# Patient Record
Sex: FEMALE | Race: WHITE
[De-identification: names, ages, dates, MRNs, and addresses within clinical notes are randomized per-mention and may not be internally consistent; named-entity substitution may affect disease eponyms.]

---

## 2021-12-22 ENCOUNTER — HOSPITAL ENCOUNTER (EMERGENCY)
Dept: HOSPITAL 11 - JP.ED | Age: 81
Discharge: HOME | End: 2021-12-22
Payer: MEDICARE

## 2021-12-22 VITALS — DIASTOLIC BLOOD PRESSURE: 51 MMHG | SYSTOLIC BLOOD PRESSURE: 113 MMHG | HEART RATE: 116 BPM

## 2021-12-22 DIAGNOSIS — M94.0: ICD-10-CM

## 2021-12-22 DIAGNOSIS — Z88.5: ICD-10-CM

## 2021-12-22 DIAGNOSIS — Z88.2: ICD-10-CM

## 2021-12-22 DIAGNOSIS — Z88.0: ICD-10-CM

## 2021-12-22 DIAGNOSIS — Z79.899: ICD-10-CM

## 2021-12-22 DIAGNOSIS — Z20.822: ICD-10-CM

## 2021-12-22 DIAGNOSIS — J20.8: Primary | ICD-10-CM

## 2021-12-22 LAB — SARS-COV-2 RNA RESP QL NAA+PROBE: NEGATIVE

## 2021-12-22 PROCEDURE — 0241U: CPT

## 2021-12-22 PROCEDURE — 99285 EMERGENCY DEPT VISIT HI MDM: CPT

## 2021-12-22 PROCEDURE — 71046 X-RAY EXAM CHEST 2 VIEWS: CPT

## 2021-12-22 NOTE — EDM.PDOC
ED HPI GENERAL MEDICAL PROBLEM





- General


Chief Complaint: General


Stated Complaint: RIGHT SIDE ABD PAIN


Time Seen by Provider: 12/22/21 02:35


Source of Information: Reports: Patient, Family


History Limitations: Reports: No Limitations





- History of Present Illness


INITIAL COMMENTS - FREE TEXT/NARRATIVE: 





81-year-old female with a localized pain in her right anterior chest wall from 

coughing, she has had a bad cough for the past 5 days.  She was seen in the 

clinic and checked for Covid which was negative.  No fevers or chills, no 

productive cough.  She has been vaccinated for Covid.  She does have a history 

of reactive airways and has an inhaler at home.  Tonight she developed some 

intense pain in the right anterior chest just above the abdomen, it was so bad 

she felt she needed to come in but none now feels better.  There is increased 

pain with breathing or coughing.  She does not feel short of breath.


Onset: Gradual


Duration: Day(s): (5 days)


Location: Reports: Chest (Right side)


Quality: Reports: Sharp


Worsens with: Reports: Other (Coughing or breathing causes increased pain), 

Movement


Associated Symptoms: Reports: Cough, Fever/Chills, Malaise, Shortness of Breath.

 Denies: Nausea/Vomiting


  ** Right Upper Abdomen


Pain Score (Numeric/FACES): 5





- Related Data


                                    Allergies











Allergy/AdvReac Type Severity Reaction Status Date / Time


 


amoxicillin [Amoxicillin] Allergy Intermediate Rash Verified 01/27/14 06:44


 


hydromorphone HCl Allergy  Nausea Verified 01/28/14 08:50





[From Dilaudid]     


 


Sulfa (Sulfonamide Allergy  Cannot Verified 01/27/14 06:44





Antibiotics)   Remember  











Home Meds: 


                                    Home Meds





Magnesium 1 tab PO TID 09/08/13 [History]


Calcium Carbonate [Calcium] 250 mg PO DAILY 01/24/14 [History]


Cholecalciferol (Vitamin D3) [Vitamin D3] 2,000 unit PO DAILY 01/24/14 [History]


Magnesium Hydroxide [Milk of Magnesia Concentrated] 10 ml PO DAILY PRN #10 ml 

01/28/14 [Rx]


Albuterol Sulfate [Albuterol Sulfate Hfa] 2 puff INH ASDIRECTED 12/22/21 

[History]











Past Medical History


HEENT History: Reports: Impaired Vision, Other (See Below)


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


Respiratory History: Reports: Bronchitis, Recurrent


OB/GYN History: Reports: Pregnancy


Musculoskeletal History: Reports: Other (See Below)


Other Musculoskeletal History: R should pain


Neurological History: Reports: Other (See Below)


Other Neuro History: memory issues





- Infectious Disease History


Infectious Disease History: Reports: Chicken Pox





- Past Surgical History


Head Surgeries/Procedures: Reports: Shunt


GI Surgical History: Reports: Cholecystectomy





Social & Family History





- Tobacco Use


Tobacco Use Status *Q: Current Status Unknown





- Caffeine Use


Caffeine Use: Reports: Coffee





- Recreational Drug Use


Recreational Drug Use: No





ED ROS GENERAL





- Review of Systems


Review Of Systems: See Below


Constitutional: Reports: Fever, Chills, Malaise


HEENT: Denies: Rhinitis, Throat Pain


Respiratory: Reports: Shortness of Breath, Cough.  Denies: Sputum


Cardiovascular: Reports: Chest Pain (Very localized right anterior chest wall 

pain)


GI/Abdominal: Reports: No Symptoms


: Reports: No Symptoms


Skin: Reports: No Symptoms


Neurological: Reports: No Symptoms


Psychiatric: Reports: No Symptoms





ED EXAM, GENERAL





- Physical Exam


Exam: See Below


Free Text/Narrative:: 





She does have a fairly persistent dry cough


Exam Limited By: No Limitations


General Appearance: Alert, No Apparent Distress


Eye Exam: Bilateral Eye: Normal Inspection


Head: Atraumatic


Neck: Non-Tender.  No: Lymphadenopathy (R), Lymphadenopathy (L)


Respiratory/Chest: No Respiratory Distress, Lungs Clear, Other (Patient did have

 an area tenderness on the lower right costochondral junction with direct 

palpation, no increased pain with lateral compression.  No rash over the painful

 area)


Cardiovascular: Regular Rate, Rhythm, Tachycardia (Mild tachycardia)


GI/Abdominal: Soft, Non-Tender


Extremities: No Pedal Edema


Neurological: Alert, Oriented


Psychiatric: Normal Affect, Normal Mood


Skin Exam: Warm, Dry





Course





- Vital Signs


Last Recorded V/S: 


                                Last Vital Signs











Temp  97.5 F   12/22/21 02:27


 


Pulse  116 H  12/22/21 02:27


 


Resp  16   12/22/21 02:27


 


BP  113/51 L  12/22/21 02:27


 


Pulse Ox  95   12/22/21 02:27














- Orders/Labs/Meds


Orders: 


                               Active Orders 24 hr











 Category Date Time Status


 


 Chest 2V [CR] Routine Exams  12/22/21 02:51 Taken


 


 Isolation [COMM] Stat Oth  12/22/21 02:51 Ordered











Labs: 


                                Laboratory Tests











  12/22/21 Range/Units





  02:56 


 


Influenza Type A RNA  Negative  (NEGATIVE)  


 


RSV RNA ([facility])  Negative  (NEGATIVE)  


 


Influenza Type B RNA  Negative  (NEGATIVE)  


 


SARS-CoV-2 RNA ([facility])  Negative  (NEGATIVE)  














- Re-Assessments/Exams


Free Text/Narrative Re-Assessment/Exam: 





12/22/21 02:55


This patient very likely has a viral bronchitis, now a local area of 

costochondritis from coughing.  A 2 view chest x-ray was ordered as well as a 4 

Plex viral study.  She will likely benefit from the Medrol Dosepak if the chest 

x-ray is negative.


12/22/21 03:24


Chest x-ray showed some changes of COPD but no acute findings.


12/22/21 03:35


Viral studies were negative, patient will be discharged with a Medrol Dosepak 

and 6 hydrocodone for extra pain control. She could return in 2 or 3 days if not

 improving.





Departure





- Departure


Time of Disposition: 03:51


Disposition: Home, Self-Care 01


Clinical Impression: 


 Viral bronchitis, Acute costochondritis








- Discharge Information


Instructions:  Costochondritis, Easy-to-Read


Referrals: 


Tor Kapoor MD [Primary Care Provider] - 


Forms:  ED Department Discharge


Care Plan Goals: 


Take Medrol Dosepak as directed over the next several days. Continue your 

nebulizer as needed and use water to stronger pain medications if needed 

especially when trying to rest. Consider rechecking in 3 to 4 days if not improv

ing satisfactorily, or return anytime if worsening or concerns, especially 

difficulty breathing.





Sepsis Event Note (ED)





- Evaluation


Sepsis Screening Result: No Definite Risk





- Focused Exam


Vital Signs: 


                                   Vital Signs











  Temp Pulse Resp BP Pulse Ox


 


 12/22/21 02:27  97.5 F  116 H  16  113/51 L  95














- My Orders


Last 24 Hours: 


My Active Orders





12/22/21 02:51


Chest 2V [CR] Routine 


Isolation [COMM] Stat 














- Assessment/Plan


Last 24 Hours: 


My Active Orders





12/22/21 02:51


Chest 2V [CR] Routine 


Isolation [COMM] Stat

## 2021-12-22 NOTE — CR
CHEST: 2 view

 

CLINICAL HISTORY:Dyspnea

 

COMPARISON:2014

 

FINDINGS:  Heart size and pulmonary vascular are normal. Lungs are hyperaerated.

There is some streaky infrahilar density bilaterally, greater on the right.

There is some generalized increase of the interstitial markings. No effusions

are seen

 

IMPRESSION: Lungs are hyperaerated. There is generalized increase in lung

markings. This is suspect for a pneumonitis